# Patient Record
Sex: MALE | Race: WHITE | NOT HISPANIC OR LATINO | Employment: FULL TIME | URBAN - NONMETROPOLITAN AREA
[De-identification: names, ages, dates, MRNs, and addresses within clinical notes are randomized per-mention and may not be internally consistent; named-entity substitution may affect disease eponyms.]

---

## 2017-08-01 ENCOUNTER — HISTORY (OUTPATIENT)
Dept: FAMILY MEDICINE | Facility: OTHER | Age: 37
End: 2017-08-01

## 2017-08-01 ENCOUNTER — OFFICE VISIT - GICH (OUTPATIENT)
Dept: FAMILY MEDICINE | Facility: OTHER | Age: 37
End: 2017-08-01

## 2017-08-01 DIAGNOSIS — Z00.00 ENCOUNTER FOR GENERAL ADULT MEDICAL EXAMINATION WITHOUT ABNORMAL FINDINGS: ICD-10-CM

## 2017-08-01 DIAGNOSIS — Z15.02 GENETIC SUSCEPTIBILITY TO MALIGNANT NEOPLASM OF OVARY: ICD-10-CM

## 2017-08-01 DIAGNOSIS — F41.9 ANXIETY DISORDER: ICD-10-CM

## 2017-08-01 DIAGNOSIS — Z15.01 GENETIC SUSCEPTIBILITY TO MALIGNANT NEOPLASM OF BREAST: ICD-10-CM

## 2017-08-01 LAB
CHOL/HDL RATIO - HISTORICAL: 5.3
CHOLESTEROL TOTAL: 228 MG/DL
HDLC SERPL-MCNC: 43 MG/DL (ref 23–92)
LDLC SERPL CALC-MCNC: 126 MG/DL
NON-HDL CHOLESTEROL - HISTORICAL: 185 MG/DL
PATIENT STATUS - HISTORICAL: ABNORMAL
TRIGL SERPL-MCNC: 293 MG/DL

## 2017-08-02 ENCOUNTER — COMMUNICATION - GICH (OUTPATIENT)
Dept: SURGERY | Facility: OTHER | Age: 37
End: 2017-08-02

## 2017-08-02 ENCOUNTER — COMMUNICATION - GICH (OUTPATIENT)
Dept: FAMILY MEDICINE | Facility: OTHER | Age: 37
End: 2017-08-02

## 2017-08-02 DIAGNOSIS — Z15.02 GENETIC SUSCEPTIBILITY TO MALIGNANT NEOPLASM OF OVARY: ICD-10-CM

## 2017-08-02 DIAGNOSIS — Z15.01 GENETIC SUSCEPTIBILITY TO MALIGNANT NEOPLASM OF BREAST: ICD-10-CM

## 2017-08-02 DIAGNOSIS — Z12.11 ENCOUNTER FOR SCREENING FOR MALIGNANT NEOPLASM OF COLON: ICD-10-CM

## 2017-08-04 ENCOUNTER — AMBULATORY - GICH (OUTPATIENT)
Dept: SCHEDULING | Facility: OTHER | Age: 37
End: 2017-08-04

## 2017-10-05 ENCOUNTER — HISTORY (OUTPATIENT)
Dept: SURGERY | Facility: OTHER | Age: 37
End: 2017-10-05

## 2017-10-05 ENCOUNTER — SURGERY (OUTPATIENT)
Dept: SURGERY | Facility: OTHER | Age: 37
End: 2017-10-05

## 2017-10-05 ENCOUNTER — COMMUNICATION - GICH (OUTPATIENT)
Dept: SURGERY | Facility: OTHER | Age: 37
End: 2017-10-05

## 2017-10-05 ENCOUNTER — HOSPITAL ENCOUNTER (OUTPATIENT)
Dept: SURGERY | Facility: OTHER | Age: 37
Discharge: HOME | End: 2017-10-05
Attending: SURGERY | Admitting: SURGERY

## 2017-10-23 ENCOUNTER — AMBULATORY - GICH (OUTPATIENT)
Dept: FAMILY MEDICINE | Facility: OTHER | Age: 37
End: 2017-10-23

## 2017-10-23 DIAGNOSIS — Z23 ENCOUNTER FOR IMMUNIZATION: ICD-10-CM

## 2017-10-27 ENCOUNTER — HISTORY (OUTPATIENT)
Dept: UROLOGY | Facility: OTHER | Age: 37
End: 2017-10-27

## 2017-10-27 ENCOUNTER — OFFICE VISIT - GICH (OUTPATIENT)
Dept: UROLOGY | Facility: OTHER | Age: 37
End: 2017-10-27

## 2017-10-27 ENCOUNTER — AMBULATORY - GICH (OUTPATIENT)
Dept: UROLOGY | Facility: OTHER | Age: 37
End: 2017-10-27

## 2017-10-27 DIAGNOSIS — Z30.2 ENCOUNTER FOR STERILIZATION: ICD-10-CM

## 2017-10-27 DIAGNOSIS — Z30.09 ENCOUNTER FOR OTHER GENERAL COUNSELING AND ADVICE ON CONTRACEPTION: ICD-10-CM

## 2017-11-13 ENCOUNTER — COMMUNICATION - GICH (OUTPATIENT)
Dept: UROLOGY | Facility: OTHER | Age: 37
End: 2017-11-13

## 2017-11-13 ENCOUNTER — OFFICE VISIT - GICH (OUTPATIENT)
Dept: UROLOGY | Facility: OTHER | Age: 37
End: 2017-11-13

## 2017-11-13 ENCOUNTER — HISTORY (OUTPATIENT)
Dept: UROLOGY | Facility: OTHER | Age: 37
End: 2017-11-13

## 2017-11-13 DIAGNOSIS — T81.89XA OTHER COMPLICATIONS OF PROCEDURES, NOT ELSEWHERE CLASSIFIED, INITIAL ENCOUNTER: ICD-10-CM

## 2017-12-28 NOTE — PROGRESS NOTES
Patient Information     Patient Name MRN Sex Denilson Hopkins [id number] Male 1980      Progress Notes by Moisés Mendez MD at 2017 12:05 PM     Author:  Moisés Mendez MD Service:  (none) Author Type:  Physician     Filed:  2017  3:28 PM Encounter Date:  2017 Status:  Signed     :  Moisés Mendez MD (Physician)              SUBJECTIVE:    Julius Trammell is a 37 y.o. male who presents for px and establish care    HPI: Takes ativan about once a month or so.    He at bedtime BRCA positive.  Mom had ovarian cancer and through this he was tested.  He has had genetic counseling, advised to have a colonoscopy by age 39.    PROBLEM LIST:  Patient Active Problem List     Diagnosis  Code     Anxiety F41.9     BRCA gene positive Z15.01, Z15.02     PAST MEDICAL HISTORY:  Past Medical History:     Diagnosis  Date     BRCA positive      SURGICAL HISTORY:  Past Surgical History:      Procedure  Laterality Date     KNEE ARTHROSCOPY       TONSILLECTOMY         SOCIAL HISTORY:  Social History     Social History        Marital status:       Spouse name: N/A     Number of children:  N/A     Years of education:  N/A     Occupational History      Not on file.     Social History Main Topics         Smoking status:   Never Smoker     Smokeless tobacco:   Never Used     Alcohol use   0.6 oz/week      1 Cans of beer per week         Comment: WEEKLY      Drug use:   Not on file     Sexual activity:   Not on file     Other Topics  Concern     Not on file      Social History Narrative     , 2 kids.  Wife is the infectious disease nurse at the hospital     FAMILY HISTORY:No family history on file.   CURRENT MEDICATIONS:   No current outpatient prescriptions on file.     No current facility-administered medications for this visit.      Medications have been reviewed by me and are current to the best of my knowledge and ability.    ALLERGIES:  Demerol [meperidine]    REVIEW OF  "SYSTEMS:  General: denies any general problems.  Eyes: denies problems  Ears/Nose/Throat: denies problems  Cardiovascular: denies problems  Respiratory: denies problems  Gastrointestinal: denies problems  Genitourinary: denies problems  Musculoskeletal: denies problems  Skin: denies problems  Neurologic: denies problems  Psychiatric: see HPI  Endocrine: denies problems  Heme/Lymphatic: denies problems  Allergic/Immunologic: denies problems  PHQ Depression Screening 8/1/2017   Date of PHQ exam (doc flow) 8/1/2017   1. Lack of interest/pleasure 0 - Not at all   2. Feeling down/depressed 0 - Not at all   PHQ-2 TOTAL SCORE 0   Some recent data might be hidden       OBJECTIVE:  /68  Pulse 62  Ht 1.759 m (5' 9.25\")  Wt 97.6 kg (215 lb 4 oz)  BMI 31.56 kg/m2  EXAM:   General Appearance: Pleasant, alert, appropriate appearance for age. No acute distress  Head Exam: Normal. Normocephalic, atraumatic.  Eye Exam:  Normal external eye, conjunctiva, lids, cornea. ANTONIA.  Ear Exam: Normal TM's bilaterally. Normal auditory canals and external ears. Non-tender.  Nose Exam: Normal external nose, mucus membranes, and septum.  OroPharynx Exam:  Dental hygiene adequate. Normal buccal mucosa. Normal pharynx.  Neck Exam:  Supple, no masses or nodes.  Thyroid Exam: No nodules or enlargement.  Chest/Respiratory Exam: Normal chest wall and respirations. Clear to auscultation.  Cardiovascular Exam: Regular rate and rhythm. S1, S2, no murmur, click, gallop, or rubs.  Gastrointestinal Exam: Soft, non-tender, no masses or organomegaly.  Lymphatic Exam: Non-palpable nodes in neck, clavicular, axillary, or inguinal regions.  Musculoskeletal Exam: Back is straight and non-tender, full ROM of upper and lower extremities.  Foot Exam: Left and right foot: good pedal pulses, no lesions, nail hygiene good.  Skin: no rash or abnormalities  Neurologic Exam: Nonfocal, symmetric DTRs, normal gross motor, tone coordination and no " tremor.  Psychiatric Exam: Alert and oriented - appropriate affect.    ASSESSMENT/PLAN:    ICD-10-CM    1. Routine medical exam Z00.00 LIPID PANEL   2. Anxiety F41.9 LORazepam (ATIVAN) 1 mg tablet   3. BRCA gene positive Z15.01 COLONOSCOPY SCREENING-GI     Z15.02      Mr. Christine Body mass index is 31.56 kg/(m^2). This is out of the normal range for a 37 y.o. Normal range for ages 18+ is between 18.5 and 24.9. To lose weight we reviewed risks and benefits of appropriate options such as diet, exercise, and medications. Patient's strategy will be  self-directed nutrition plan and self-directed exercise program  BP Readings from Last 1 Encounters:08/01/17 : 132/68  Mr. Christine blood pressure is out of the normal range for adults. Per JNC-8 guidelines normal adult blood pressure is < 120/80, pre-hypertensive is between 120/80 and 139/89, and hypertension is 140/90 or greater. Risks of hypertension were discussed. Patient's strategy will be to recheck blood pressure in 12 months    Moisés Mendez MD ....................  8/1/2017   12:22 PM

## 2017-12-28 NOTE — TELEPHONE ENCOUNTER
Patient Information     Patient Name MRN Sex Denilson Hopkins [id number] Male 1980      Telephone Encounter by Janet Kaplan at 2017  8:11 AM     Author:  Janet Kaplan Service:  (none) Author Type:  (none)     Filed:  2017  8:13 AM Encounter Date:  2017 Status:  Signed     :  Janet Kaplan            Patient referred by Dr. Mendez for a colonoscopy ,  Diagnosis is BRCA positive.  Please advise.  Thank you.

## 2017-12-28 NOTE — OR POSTOP
Patient Information     Patient Name MRN Sex Denilson Hopkins [id number] Male 1980      OR PostOp by Phyllis Langley RN at 10/5/2017 10:47 AM     Author:  Phyllis Langley RN Service:  (none) Author Type:  NURS- Registered Nurse     Filed:  10/5/2017 10:47 AM Date of Service:  10/5/2017 10:47 AM Status:  Signed     :  Phyllis Langley RN (NURS- Registered Nurse)            Discharge Note    Data:  Julius Trammell has been discharged home at 1005 via ambulatory accompanied by Registered Nurse.      Action:  Written discharge/follow-up instructions were provided to patient. Prescriptions : None.  Belongings sent with patient. Medications from home sent with patient/family: Not Applicable  Equipment none .     Response:  Patient verbalized understanding of discharge instructions, reason for discharge, and necessary follow-up appointments. PHYLLIS LANGLEY RN ....................  10/5/2017   10:47 AM

## 2017-12-28 NOTE — OR ANESTHESIA
Patient Information     Patient Name MRN Sex Denilson Hopkins [id number] Male 1980      OR Anesthesia by Yolanda Worley CRNA at 10/5/2017  9:09 AM     Author:  Yolanda Worley CRNA Service:  (none) Author Type:  NURS- Nurse Anesthetist     Filed:  10/5/2017  9:10 AM Date of Service:  10/5/2017  9:09 AM Status:  Signed     :  Yolanda Worley CRNA (NURS- Nurse Anesthetist)            Anesthesia Post Operative Care Note    Name: Julius Trammell  MRN:   [id number]  :    1980       Procedure Done:  See Surgeon Note        Anesthesia Technique    Anesthetic Type:  MAC       MAC Type:  NC     Oral Trauma:  No    Intraoperative Course   Hemodynamics:  Stable    Ventilation Normal:  Yes Lung Sounds:  Normal      PACU Course        Nondepolarizer Used:       Reversed: N/A   Hemodynamics:  Stable      Hydration: Euvolemic   Temperature:  36.1 - 38.3      Mental Status:  Awake, alert, follows commands   Pain Management:  Adequate   Regional Block:  No   Anesthesia Complications:  None      Vital Signs:  Temp: 96.6  F (35.9  C)  Pulse: 69  BP: 123/74  Resp: 16  SpO2: 95 %                       Active Lines:  Patient Lines/Drains/Airways Status    Active Line     Name: Placement date: Placement time: Site: Days:    PERIPHERAL VAD Right Hand 20 10/05/17   0752   Hand   less than 1                Intake & Output:       Labs:  No results for input(s): PX2GTFGATKU, UMB1NXAZNISE, PHARTERIAL, HWT2ZXTKYKSG, P7HEFLJYNVZW in the last 24 hours.    No results for input(s): MAGNESIUM in the last 24 hours.    No results for input(s): GLUCOSEMETER in the last 720 hours.        Yolanda Worley CRNA ....................  10/5/2017   9:09 AM

## 2017-12-28 NOTE — TELEPHONE ENCOUNTER
Patient Information     Patient Name MRN Sex Denilson Hopkins [id number] Male 1980      Telephone Encounter by Janet Kaplan at 2017  3:12 PM     Author:  Janet Kaplan Service:  (none) Author Type:  (none)     Filed:  2017  3:18 PM Encounter Date:  2017 Status:  Signed     :  Janet Kaplan            Screening Questions for the Scheduling of Screening Colonoscopies   (If Colonoscopy is diagnostic, Provider should review the chart before scheduling.)  Are you younger than 50 or older than 80?  Yes   Do you take aspirin or fish oil?  NO (if yes, tell patient to stop 1 week prior to Colonoscopy)  Do you take warfarin (Coumadin), clopidogrel (Plavix), apixaban (Eliquis), dabigatram (Pradaxa), rivaroxaban (Xarelto) or any blood thinner? NO  Do you use oxygen at home?  NO  Do you have kidney disease? NO  Are you on dialysis? NO  Have you had a stroke or heart attack in the last year? NO  Have you had a stent in your heart or any blood vessel in the last year? NO  Have you had a transplant of any organ? NO  Have you had a colonoscopy or upper endoscopy (EGD) before? NO          When?  NO  Date of scheduled Colonoscopy. 2017  Provider RAYA SANTO

## 2017-12-28 NOTE — OR ANESTHESIA
Patient Information     Patient Name MRN Sex Denilson Garcia [id number] Male 1980      OR Anesthesia by Yolanda Worley CRNA at 10/5/2017  8:02 AM     Author:  Yolanda Worley CRNA Service:  (none) Author Type:  NURS- Nurse Anesthetist     Filed:  10/5/2017  8:02 AM Date of Service:  10/5/2017  8:02 AM Status:  Signed     :  Yolanda Worley CRNA (NURS- Nurse Anesthetist)                                                           ANESTHESIA ASSESSMENT    Date: 10/5/17 Time: 8:02 AM      Patient:  Julius Trammell    Procedure(s) (LRB):  COLONOSCOPY (N/A)    Past Medical History:     Diagnosis  Date     BRCA positive        Past Surgical History:      Procedure  Laterality Date     KNEE ARTHROSCOPY       TONSILLECTOMY         No family history on file.    Patient Active Problem List     Diagnosis  Code     Anxiety F41.9     BRCA gene positive Z15.01, Z15.02       Prescriptions Prior to Admission       Medication  Sig Dispense Refill     LORazepam (ATIVAN) 1 mg tablet Take 1 tablet by mouth every 6 hours if needed. 30 tablet 0       Allergies:  Allergies     Allergen  Reactions     Demerol [Meperidine] Anaphylaxis       Review of Systems:  GERD: Yes (once weekly, treated with zantac)  Chest pain: No  Shortness of breath: No  Recent fever: No  Poor exercise tolerance: No  Bleeding tendency: No  Pregnant: No  Anesthesia Complications: None      History    Smoking Status      Never Smoker   Smokeless Tobacco      Never Used     Social History     Social History        Marital status:       Spouse name: N/A     Number of children:  N/A     Years of education:  N/A     Social History Main Topics         Smoking status:   Never Smoker     Smokeless tobacco:   Never Used     Alcohol use   0.6 oz/week     1 Cans of beer per week        Comment: WEEKLY      Drug use:   No     Sexual activity:   Not on file     Other Topics  Concern     Not on file      Social History Narrative     , 2  kids.  Wife is the infectious disease nurse at the hospital       Physical Examination:  /74  Pulse 69  Temp 96.6  F (35.9  C)  Resp 16  SpO2 95% There is no height or weight on file to calculate BMI. There is no height or weight on file to calculate BSA.  Dental Condition: Good     Mallampati Score (Airway): II  Cardiovascular: Normal  Pulmonary: Normal  Other: (not recorded)    Recent Labs in [facility]:    No results for input(s): SODIUM, POTASSIUM, CHLORIDE, PU4DNOPK, ANIONGAP, BUN, CREATININE, BUNCREARATIO, CALCIUM, GLUCOSE, GLUCOSEMETER, KETONES, MAGNESIUM, WBC, HGB, HCT, PLT, ABORH, RHTYPE, PREGURINE, BHCGQL, HCGBETAQUANT, INR in the last 72 hours.          Assessment/Plan:  ASA Class: I  Risk of dental injury discussed: Yes  NPO status confirmed: Yes  Anesthetic Plan: MAC  Risk/Benefit/Alt discussed: Yes  Questions answered: Yes  Emergency Case?: No  Labs/ECG/Radiology Reviewed?: Yes      H&P Reviewed.  Patient Examined.      Provider Electronic Signature:  Yolanda Worley CRNA

## 2017-12-28 NOTE — TELEPHONE ENCOUNTER
Patient Information     Patient Name MRN Sex Denilson Hopkins [id number] Male 1980      Telephone Encounter by Shantel Ramsay MD at 2017 10:04 AM     Author:  Shantel Ramsay MD Service:  (none) Author Type:  Physician     Filed:  2017 10:04 AM Encounter Date:  2017 Status:  Signed     :  Shantel Ramsay MD (Physician)            Ok to schedule. Thanks! Shantel Ramsay MD ....................  2017   10:04 AM

## 2017-12-28 NOTE — PROGRESS NOTES
Patient Information     Patient Name MRN Sex Denilson Hopkins [id number] Male 1980      Progress Notes by Gurpreet Torrez MD at 10/27/2017 10:45 AM     Author:  Gurpreet Torrez MD Service:  (none) Author Type:  Physician     Filed:  10/27/2017 11:17 AM Encounter Date:  10/27/2017 Status:  Signed     :  Gurpreet Torrez MD (Physician)            Preoperative diagnosis  Elective sterilization    Postoperative diagnosis  Elective sterilization    Procedure performed  Bilateral vasectomy    Surgeon  Gurpreet Torrez MD    Anesthesia  8 mL lidocaine 2% local injection    Complications  None    EBL  <1 mL    Specimen  Left vas  Right vas    Indications  37 y.o. male agreed to undergo the above named procedure after discussion of the alternatives, risks and benefits.  Informed consent was obtained.      Procedure  The patient was brought to the procedure room and placed in supine position.  His scrotum was prepped with Hibiclens and he was draped in a sterile fashion.  A timeout was performed.    Lidocaine 2% was used to anesthetize the scrotal skin as well as perivasal sheath initially on the patient's left.  A 1 cm skin incision was created with the sharp-tip mosquito and a vas clamp utilized through this incision to grasp the vas.  The left vas was elevated to the skin surface and dissected free of its perivasal sheath.  The vas was transected and the lumen was cauterized both proximally and distally.  A 4-0 chromic suture was utilized to place the proximal vasal portion under the perivasal sheath, such that the 2 ends were divided by the perivasal sheath (fascial interposition).  This portion of the vas was then allowed to drop back into the left hemiscrotum.  The right side was treated next in the same manner as described above.  The skin incision was closed with the 4-0 chromic suture.  The patient tolerated the procedure well.    It was again reiterated to the patient that he would remain fertile for  sometime and should present to the office for a post-vacectomy semen analysis to confirm sterility in 3 months.  The patient again expressed understanding.

## 2017-12-28 NOTE — PROGRESS NOTES
Patient Information     Patient Name MRN Sex Denilson Hopkins [id number] Male 1980      Progress Notes by Gurpreet Torrez MD at 2017  2:30 PM     Author:  Gurpreet Torrez MD Service:  (none) Author Type:  Physician     Filed:  2017  3:18 PM Encounter Date:  2017 Status:  Signed     :  Gurpreet Torrez MD (Physician)            S/O  Patient presented today due to concerns about wound healing following vasectomy.  Patient has a localized less than 1 cm area of induration at the entry site of the procedure near the stitch.    A/P  exam revealed stitch granuloma- no signs of infection  Follow-up as needed

## 2017-12-28 NOTE — TELEPHONE ENCOUNTER
Patient Information     Patient Name MRN Sex Denilson Hopkins [id number] Male 1980      Telephone Encounter by Yanci Mello RN at 2017 12:01 PM     Author:  Yanci Mello RN Service:  (none) Author Type:  NURS- Registered Nurse     Filed:  2017 12:02 PM Encounter Date:  2017 Status:  Signed     :  Yanci Mello RN (NURS- Registered Nurse)            Patient states that the incision site feels really tight and was wondering if this was normal.  Appointment scheduled for patient to see Gurpreet Torrez MD today at 2:30.  Yanci Mello RN.........2017...12:02 PM

## 2017-12-28 NOTE — PROCEDURES
Patient Information     Patient Name MRN Sex Denilson Hopkins [id number] Male 1980      Procedures by Shantel Ramsay MD at 10/5/2017  9:09 AM     Author:  Shantel Ramsay MD Service:  (none) Author Type:  Physician     Filed:  10/5/2017  9:10 AM Date of Service:  10/5/2017  9:09 AM Status:  Signed     :  Shantel Ramsay MD (Physician)        Pre-procedure Diagnoses:    1. BRCA positive [Z15.01, Z15.02]           Post-procedure Diagnoses:    1. BRCA positive [Z15.01, Z15.02]           Procedures:    1. COLONOSCOPY SCREENING [243899 (Custom)]               PROCEDURE NOTE    SURGEON: Shantel Ramsay MD.    PRE-OP DIAGNOSIS:  Screening Colonoscopy      POST-OP DIAGNOSIS: normal colon    PROCEDURE:  Screening colonoscopy    ESTIMATED BLOOD LOSS: none    COMPLICATIONS:  None    SPECIMEN:  none    ANESTHESIA:  See anesthesia note, anesthesia requested due to: chronic benzodiazepine use    INDICATION FOR THE PROCEDURE: The patient is a 37 y.o. male. The patient has no complaints. I explained to the patient the risks, benefits and alternatives to screening colonoscopy for evaluating the colon for colon polyps and colon cancer. We specifically discussed the risks of bleeding, infection, perforation, potential inability to reach the cecum and the risks of sedation. The patient's questions were answered and the patient wished to proceed. Informed consent paperwork was completed.    PROCEDURE: The patient was taken to the endoscopy suite. Appropriate monitors were attached. The patient was placed in the left lateral decubitus position.Timeout was performed confirming the patient's identity and procedure to be performed.  After appropriate sedation was confirmed, digital rectal exam was performed.  There was normal tone and no gross abnormality was noted. The lubricated colonoscope was introduced into the anus the colon was insufflated with air. The prep quality was adequate. Under direct visualization the scope  was advanced to the cecum. The ileocecal valve was intubated and the terminal ileum inspected. No gross abnormality was noted. The scope was withdrawn back into the cecum. The mucosa of colon was inspected while withdrawing the scope. No abnormalities were noted. The scope was retroflexed in the rectum and the anorectal junction was inspected. No abnormalities were noted. The scope was returned to a neutral position and the colon was decompressed. The scope was removed. The patient tolerated the procedure with no immediately apparent complication. The patient was taken to recovery in stable condition.      FOLLOW UP:  RECOMMEND high fiber diet, follow up: 5 year screening colonoscopy will call if that changes!     Shantel Ramsay MD

## 2017-12-28 NOTE — PROGRESS NOTES
Patient Information     Patient Name MRN Denilson Villareal [id number] Male 1980      Progress Notes by Gurpreet Torrez MD at 10/27/2017 10:00 AM     Author:  Gurpreet Torrez MD Service:  (none) Author Type:  Physician     Filed:  10/27/2017 10:29 AM Encounter Date:  10/27/2017 Status:  Signed     :  Gurpreet Torrez MD (Physician)            Type of Visit  NPV    Chief Complaint  Elective sterilization    HPI  Mr. Trammell is a 37 y.o. male who presents with desire for irreversible, elective sterilization.    He has 2 kids.    His wife is supportive of this decision.    He has been considering this decision for 1 years.  He denies erectile dysfunction.      Past Medical History  He  has a past medical history of BRCA 1 positive.  Patient Active Problem List     Diagnosis  Code     Anxiety F41.9     BRCA gene positive Z15.01, Z15.02       Past Surgical History  He  has a past surgical history that includes tonsillectomy; knee arthroscopy; and colonoscopy screening (10/05/2017).    Medications  He has a current medication list which includes the following prescription(s): hydrocodone-acetaminophen (5-325 mg) and lorazepam.    Allergies  Allergies     Allergen  Reactions     Demerol [Meperidine] Anaphylaxis       Social History  He  reports that he has never smoked. He has never used smokeless tobacco. He reports that he drinks about 0.6 oz of alcohol per week  He reports that he does not use illicit drugs.  No drug abuse.    Family History  Family History       Problem   Relation Age of Onset     Cancer  Mother      fallopian tube, breast-BRCA +       No Known Problems  Sister      BRCA Negative       No Known Problems  Brother      BRCA negative       No Known Problems  Brother      hasn't had genetic testing         Review of Systems  I personally reviewed the ROS with the patient.    Nursing Notes:   Preeti Nuno  10/27/2017 10:08 AM  Unsigned  Here for Vasectomy consult and Vasectomy.  Review  of Systems:    Weight loss:    No     Recent fever/chills:  No   Night sweats:   No  Current skin rash:  No   Recent hair loss:  No  Heat intolerance:  No   Cold intolerance:  No  Chest pain:   No   Palpitations:   No  Shortness of breath:  No   Wheezing:   No  Constipation:    No   Diarrhea:   No   Nausea:   No   Vomiting:   No   Kidney/side pain:  No   Back pain:   No  Frequent headaches:  No   Dizziness:     No  Leg swelling:   No   Calf pain:    No        Parents, brothers or sisters with history of kidney cancer?   No  Parents, brothers or sisters with history of bladder cancer: No    Preeti Nuno KENNEDY  10/27/2017  10:08 AM          Physical Exam  Vitals:     10/27/17 1006   BP: 120/80   Resp: 14   Weight: 97.5 kg (215 lb)     Constitutional: NAD, WDWN.   Head: NCAT  Eyes: Conjunctivae normal  Cardiovascular: Regular rate.  Pulmonary/Chest: Respirations are even and non-labored bilaterally.  Abdominal: Soft. No distension, tenderness, masses or guarding. No CVA tenderness.  Neurological: A + O x 3.  Cranial Nerves II-XII grossly intact.  Extremities: CONI x 4, Warm. No clubbing.  No cyanosis.    Skin: Pink, warm and dry.  No rashes noted.  Psychiatric:  Normal mood and affect  Genitourinary:  Phallus normal without lesions, testicles descended bilaterally, no masses.    Bilateral vasa palpable    Assessment  Mr. Trammell is a 37 y.o. male who presents today requesting permanent, irreversible surgical sterilization.  He was instructed to trim his pubic hair the night prior with a clippers.  The vasectomy was discussed in detail with the patient today including the risks which are failure of the procedure, infection, bleeding and/or development of chronic testicular pain.      Furthermore, the patient was told he would remain fertile following the procedure until he provided a semen analysis that met the definition of infertile (no sperm present or <100,000 nonmotile sperm/mL)  This is usually planned for 3  months after the procedure.  The patient expressed understanding and desire to proceed.    Plan  Rx for Norco prior to and after the procedure.     He understands he needs a  the day of the procedure if he chooses to take the narcotic.  Provided vasectomy hand out again outlining the above risks and benefits as well as need for follow up semen analysis

## 2017-12-29 NOTE — H&P
Patient Information     Patient Name MRN Sex Denilson Hopkins [id number] Male 1980      H&P by Shantel Ramsay MD at 10/5/2017  8:33 AM     Author:  Shantel Ramsay MD Service:  (none) Author Type:  Physician     Filed:  10/5/2017  8:37 AM Date of Service:  10/5/2017  8:33 AM Status:  Signed     :  Shantel Ramsay MD (Physician)            PRE-PROCEDURE NOTE    CHIEF COMPLAINT / REASON FOR PROCEDURE:  Need for screening colonoscopy.    PERTINENT HISTORY   Patient with no complaints. He has a history of BRCA +. Previous colonoscopy none. No diarrhea, constipation, abdominal pain or rectal bleeding. No family history of colon polyps or colon cancer.    Past Medical History:     Diagnosis  Date     BRCA positive      Past Surgical History:      Procedure  Laterality Date     KNEE ARTHROSCOPY       TONSILLECTOMY       Other:  None  Bleeding tendencies:  No    Relevant Family History:  mother BRCA +    Relevant Social History:  None    A relevant review of systems was performed and was negative.    ALLERGIES/SENSITIVITIES:   Allergies     Allergen  Reactions     Demerol [Meperidine] Anaphylaxis        CURRENT MEDICATIONS:    Current Facility-Administered Medications        Medication  Dose Route Frequency Last Rate     lactated Ringers infusion  25 mL/hr Intravenous continuous 25 mL/hr (10/05/17 0752)     lidocaine (1%) injection 0.1-1 mL  0.1-1 mL Intra-Dermal one time prn       sodium chloride 0.9% 5 mL syringe (NORMAL SALINE)  5 mL Intravenous Each Time PRN        Prior to Admission medications          Medication Sig Start Date End Date Taking? Last Dose Authorizing Provider   LORazepam (ATIVAN) 1 mg tablet Take 1 tablet by mouth every 6 hours if needed. 17   [facility]       PRE-SEDATION ASSESSMENT:    Lung Exam:  Normal  Heart Exam:  Normal    Comment(s):      IMPRESSION:  Need for screening colonoscopy.    PLAN:  I discussed screening colonoscopy with the patient.  Anesthesia coverage requested due to chronic benzodiazepine use.    Shantel Ramsay MD

## 2017-12-29 NOTE — PATIENT INSTRUCTIONS
Patient Information     Patient Name MRDenilson Paz [id number] Male 1980      Patient Instructions by Bertha Zee RN at 10/27/2017 10:45 AM     Author:  Bertha Zee RN Service:  (none) Author Type:  NURS- Registered Nurse     Filed:  10/27/2017 10:26 AM Encounter Date:  10/27/2017 Status:  Signed     :  Bertha Zee RN (NURS- Registered Nurse)            Home Care after Vasectomy   Follow these guidelines for your care after your surgery to help your recovery.    Activity  Limit your activity for the first 5 days after the procedure to light activity.  You may return to work typically in 2 days.  Limit lifting, pushing or pulling to less than 20 pounds until you are no longer sore.   Limit running and long walks until you are no longer sore.   Limit sexual activity for 5 days.    Swelling  Scrotal swelling from the surgery may take weeks to get better. You should call your doctor if the swelling is severe and the scrotum is larger than an orange.  Apply a bag of frozen peas to the scrotum for 15 minutes every 1-2 hours for the first 48 hours when you are awake to limit swelling. It works best to not apply the bag of frozen peas directly to the skin.  Wear a jock strap to support your scrotum and reduce swelling.  Continue wearing the jock strap until you are no longer sore, 1-2 weeks.    Incision care  The single stitch placed in the scrotum will dissolve and does not need to be removed.  A small amount of blood may stain the dressings for up to 2-3 days after the procedure.  For the first few days, apply two or three gauze pads to the site each day and as needed to keep the dressing dry. This will protect the incision and help keep your clothes clean.  When you are no longer having any drainage, stop using the gauze pads over the site.    Bathing or showering  You may shower after the procedure but do not scrub the stitch area.  Allow the water to wash over the stitch and do not  scrub the area. Dry the site gently by patting it with a clean towel.  Tub baths should be avoided for 7 days after surgery.  Swimming should be avoided for 14 days after surgery.      Pain control  You were provided with a pain medication prescription during the clinic consultation, please use this as needed.  Also, please take ibuprofen as we discussed in clinic.  Pain most often is eased after 5 to 7 days.    Sexual activity  Please avoid ejaculating for 1 week after procedure.    Follow up  Following this procedure you are still considered fertile and would be able to impregnate your partner.  You should have a semen analysis performed 3 months or greater after your procedure to confirm sterility.  Ideally you would ejaculate about 2-3 dozen times following the vasectomy and prior to semen collection.  Use birth control until the semen analysis is confirmed sterile.    Use contraceptives until this is confirmed to prevent pregnancy.    Contacts  General Questions: [phone]  Appointments:  [phone]  Emergencies:  [phone]    When to call your doctor  Call the urology office if you have any of the following:   Severe swelling, larger than the size of an orange    A large amount of fluid drainage that soaks several pads per day   Pain that is not controlled with pain medicine, jock strap and use of frozen peas   Any signs of infection such as the following:    -Redness or tenderness around the incision site worsening after 2-3 days or   -Pus type drainage from the incision or   -Fever of greater than 101 degrees F    Also call the office if you have any questions or concerns about your care.

## 2017-12-30 NOTE — NURSING NOTE
Patient Information     Patient Name MRN Sex Denilson Hopkins [id number] Male 1980      Nursing Note by Bertha Zee RN at 10/27/2017 10:45 AM     Author:  Bertha Zee RN Service:  (none) Author Type:  NURS- Registered Nurse     Filed:  10/27/2017 11:17 AM Encounter Date:  10/27/2017 Status:  Signed     :  Bertha Zee RN (NURS- Registered Nurse)            Vasectomy  Per verbal order read back by Gurpreet Torrez MD to prep patient for vasectomy.  Patient positioned in supine position, perineum area prepped with chlorhexidene Gluconate and patient draped per sterile technique.    Lidocaine 2%  Lot #: [id number]  Expiration date: 2019  : [facility]  NDC: 4225-1129-30    [place] Protocol    A. Pre-procedure verification complete yes  1-relevant information / documentation available, reviewed and properly matched to the patient; 2-consent accurate and complete, 3-equipment and supplies available    B. Site marking complete N/A  Site marked if not in continuous attendance with patient    C. TIME OUT completed yes  Time Out was conducted just prior to starting procedure to verify the eight required elements: 1-patient identity, 2-consent accurate and complete, 3-position, 4-correct side/site marked (if applicable), 5-procedure, 6-relevant images / results properly labeled and displayed (if applicable), 7-antibiotics / irrigation fluids (if applicable), 8-safety precautions.    After procedure perineum area rinsed. Semen analysis container given to patient. Patient reminded to have a semen analysis performed 3 months after the procedure to confirm sterility and to ejaculate about 1-2 dozen times following the vasectomy and prior to semen collection. Discharge instructions reviewed with patient. Patient verbalized understanding of discharge instructions and discharged ambulatory.

## 2017-12-30 NOTE — NURSING NOTE
Patient Information     Patient Name MRN Denilson Villareal [id number] Male 1980      Nursing Note by Preeti Nuno at 10/27/2017 10:45 AM     Author:  Preeti Nuno Service:  (none) Author Type:  (none)     Filed:  10/27/2017 10:24 AM Encounter Date:  10/27/2017 Status:  Signed     :  Preeti Nuno            Here for vasectomy.  Preeti Nuno LPN  10/27/2017  10:24 AM

## 2017-12-30 NOTE — NURSING NOTE
Patient Information     Patient Name MRN Denilson Villareal [id number] Male 1980      Nursing Note by Preeti Nuno at 2017  2:30 PM     Author:  Preeti Nuno Service:  (none) Author Type:  (none)     Filed:  2017  2:39 PM Encounter Date:  2017 Status:  Signed     :  Preeti Nuno            Here for Vasectomy incision check.  Review of Systems:    Weight loss:    No     Recent fever/chills:  No   Night sweats:   No  Current skin rash:  No   Recent hair loss:  No  Heat intolerance:  No   Cold intolerance:  No  Chest pain:   No   Palpitations:   No  Shortness of breath:  No   Wheezing:   No  Constipation:    No   Diarrhea:   No   Nausea:   No   Vomiting:   No   Kidney/side pain:  No   Back pain:   No  Frequent headaches:  No   Dizziness:     No  Leg swelling:   No   Calf pain:    No    Preeti Nuno LPN  2017  2:34 PM

## 2017-12-30 NOTE — NURSING NOTE
Patient Information     Patient Name MRN Denilson Villareal [id number] Male 1980      Nursing Note by Sidra Canales at 2017 11:30 AM     Author:  Sidra Canales Service:  (none) Author Type:  (none)     Filed:  2017 12:01 PM Encounter Date:  2017 Status:  Signed     :  Sidra Canales            Patient  presents today to establish care.  Sidra Canales LPN...............2017   11:42 AM

## 2017-12-30 NOTE — NURSING NOTE
Patient Information     Patient Name MRN Denilson Villareal [id number] Male 1980      Nursing Note by Preeti Nuno at 10/27/2017 10:00 AM     Author:  Preeti Nuno Service:  (none) Author Type:  (none)     Filed:  10/27/2017 10:29 AM Encounter Date:  10/27/2017 Status:  Signed     :  Preeti Nuno            Here for Vasectomy consult and Vasectomy.  Review of Systems:    Weight loss:    No     Recent fever/chills:  No   Night sweats:   No  Current skin rash:  No   Recent hair loss:  No  Heat intolerance:  No   Cold intolerance:  No  Chest pain:   No   Palpitations:   No  Shortness of breath:  No   Wheezing:   No  Constipation:    No   Diarrhea:   No   Nausea:   No   Vomiting:   No   Kidney/side pain:  No   Back pain:   No  Frequent headaches:  No   Dizziness:     No  Leg swelling:   No   Calf pain:    No        Parents, brothers or sisters with history of kidney cancer?   No  Parents, brothers or sisters with history of bladder cancer: No    Preeti Nuno LPN  10/27/2017  10:08 AM

## 2018-01-25 VITALS
WEIGHT: 215 LBS | RESPIRATION RATE: 14 BRPM | RESPIRATION RATE: 16 BRPM | BODY MASS INDEX: 31.88 KG/M2 | HEIGHT: 69 IN | HEART RATE: 62 BPM | SYSTOLIC BLOOD PRESSURE: 120 MMHG | SYSTOLIC BLOOD PRESSURE: 132 MMHG | DIASTOLIC BLOOD PRESSURE: 80 MMHG | WEIGHT: 215 LBS | DIASTOLIC BLOOD PRESSURE: 70 MMHG | SYSTOLIC BLOOD PRESSURE: 110 MMHG | DIASTOLIC BLOOD PRESSURE: 68 MMHG | WEIGHT: 215.25 LBS

## 2018-01-25 VITALS — WEIGHT: 215 LBS | DIASTOLIC BLOOD PRESSURE: 80 MMHG | RESPIRATION RATE: 14 BRPM | SYSTOLIC BLOOD PRESSURE: 120 MMHG

## 2018-02-15 ENCOUNTER — DOCUMENTATION ONLY (OUTPATIENT)
Dept: FAMILY MEDICINE | Facility: OTHER | Age: 38
End: 2018-02-15

## 2018-02-15 PROBLEM — Z15.01 BRCA GENE POSITIVE: Status: ACTIVE | Noted: 2017-08-01

## 2018-02-15 PROBLEM — Z15.09 BRCA GENE POSITIVE: Status: ACTIVE | Noted: 2017-08-01

## 2018-02-15 PROBLEM — F41.9 ANXIETY: Status: ACTIVE | Noted: 2017-08-01

## 2018-02-28 DIAGNOSIS — Z98.52 STATUS POST VASECTOMY: Primary | ICD-10-CM

## 2018-07-23 NOTE — PROGRESS NOTES
Patient Information     Patient Name  Denilson Trammell MRN  [id number] Sex  Male   1980      Letter by Moisés Mendez MD at      Author:  Moisés Mendez MD Service:  (none) Author Type:  (none)    Filed:   Encounter Date:  2017 Status:  (Other)         [employer]  [address] [address] [address] [place] [address]    2017    Julius Trammell  [address] [facility] [address]    Dear Mr. Trammell    We have received your [employer] application. However, additional information is required before we can complete the process. The following item(s) are missing or incomplete:    Email does not match email listed in your medical record.    Demographic information in your health record, must be updated by your primary care clinic or by calling [employer] at [phone].    You will need to complete and submit a new application with all required information. Please go to [email or web address] to:    print new application form(s) by clicking on Sign up now    sign up online for an account for yourself.    For questions or technical assistance, call [phone].    Thank you for choosing [employer]!

## 2018-07-24 NOTE — PROGRESS NOTES
Patient Information     Patient Name  Denilson Trammell MRN  [id number] Sex  Male   1980      Letter by Moisés Mendez MD at      Author:  Moisés Mendez MD Service:  (none) Author Type:  (none)    Filed:   Encounter Date:  2017 Status:  (Other)           Julius Trammell  [address]   [place] [address]          2017    Dear Mr. Trammell:    Following are the tests completed during your last clinic visit.  Repeat this in a year after you lose the weight.    Results for orders placed or performed in visit on 17      LIPID PANEL      Result  Value Ref Range    CHOLESTEROL,TOTAL 228 (H) <200 mg/dL    TRIGLYCERIDES 293 (H) <150 mg/dL    HDL CHOLESTEROL 43 23 - 92 mg/dL    NON-HDL CHOLESTEROL 185 (H) <145 mg/dl    CHOL/HDL RATIO            5.30 (H) <4.50                    LDL CHOLESTEROL 126 (H) <100 mg/dL    PATIENT STATUS            NON-FASTING                         If you have any further questions or problems contact my office at  [phone].    Thank you,    Moisés Mendez MD

## 2018-07-24 NOTE — PROGRESS NOTES
Patient Information     Patient Name  Denilson Trammell MRN  [id number] Sex  Male   1980      Letter by Shantel Ramsay MD at      Author:  Shantel Ramsay MD Service:  (none) Author Type:  (none)    Filed:   Encounter Date:  10/5/2017 Status:  (Other)           Julius Trammell  [address]  [place] [address]          2017    Dear Mr. Trammell:    This letter is in regards to your colonoscopy that was done by Shantel Ramsay MD on 10/5/17.  Your colonoscopy was normal.  There were no polyps or other abnormalities found.  A repeat colonoscopy is recommended in 5 years unless you have problems.     Dr. Shantel Ramsay MD also recommends a high fiber diet. A fiber supplement such as Metamucil, FiberCon, or Citrucel is recommended. Generic forms of these supplements are fine. These are available over the counter.     If you have any questions, please call the Surgery department at [phone].  A copy of your colonoscopy will be placed in your electronic chart for your primary care provider's review.    Sincerely,        Marianna STANFORD LPN  General Surgery    Reviewed and electronically signed by provider.

## 2018-07-24 NOTE — PROGRESS NOTES
Patient Information     Patient Name  Denilson Trammell MRN  [id number] Sex  Male   1980      Letter by Shantel Ramsay MD at      Author:  Shantel Ramsay MD Service:  (none) Author Type:  (none)    Filed:   Encounter Date:  10/5/2017 Status:  (Other)           Julius Trammell  [address]  [place] [address]          2017    Dear Mr. Trammell:    This letter is in regards to your colonoscopy that was done by Shantel Ramsay MD on 10/5/17.  Your colonoscopy was normal.  There were no polyps or other abnormalities found.  A repeat colonoscopy is recommended in 10 years unless you have problems.     Dr. Shantel Ramsay MD also recommends a high fiber diet. A fiber supplement such as Metamucil, FiberCon, or Citrucel is recommended. Generic forms of these supplements are fine. These are available over the counter.     If you have any questions, please call the Surgery department at [phone].  A copy of your colonoscopy will be placed in your electronic chart for your primary care provider's review.    Sincerely,        Marianna STANFORD LPN  General Surgery    Reviewed and electronically signed by provider.

## 2019-10-26 ENCOUNTER — ALLIED HEALTH/NURSE VISIT (OUTPATIENT)
Dept: FAMILY MEDICINE | Facility: OTHER | Age: 39
End: 2019-10-26
Attending: FAMILY MEDICINE
Payer: COMMERCIAL

## 2019-10-26 DIAGNOSIS — Z23 NEED FOR PROPHYLACTIC VACCINATION AND INOCULATION AGAINST INFLUENZA: Primary | ICD-10-CM

## 2019-10-26 PROCEDURE — 90686 IIV4 VACC NO PRSV 0.5 ML IM: CPT

## 2019-10-26 PROCEDURE — 90471 IMMUNIZATION ADMIN: CPT

## 2020-03-02 ENCOUNTER — HEALTH MAINTENANCE LETTER (OUTPATIENT)
Age: 40
End: 2020-03-02

## 2020-12-14 ENCOUNTER — HEALTH MAINTENANCE LETTER (OUTPATIENT)
Age: 40
End: 2020-12-14

## 2021-04-18 ENCOUNTER — HEALTH MAINTENANCE LETTER (OUTPATIENT)
Age: 41
End: 2021-04-18

## 2021-04-23 ENCOUNTER — OFFICE VISIT (OUTPATIENT)
Dept: FAMILY MEDICINE | Facility: OTHER | Age: 41
End: 2021-04-23
Attending: FAMILY MEDICINE
Payer: COMMERCIAL

## 2021-04-23 VITALS
TEMPERATURE: 96.8 F | RESPIRATION RATE: 16 BRPM | HEIGHT: 70 IN | SYSTOLIC BLOOD PRESSURE: 114 MMHG | WEIGHT: 226 LBS | OXYGEN SATURATION: 97 % | HEART RATE: 69 BPM | DIASTOLIC BLOOD PRESSURE: 76 MMHG | BODY MASS INDEX: 32.35 KG/M2

## 2021-04-23 DIAGNOSIS — Z13.1 SCREENING FOR DIABETES MELLITUS: ICD-10-CM

## 2021-04-23 DIAGNOSIS — Z00.00 ROUTINE GENERAL MEDICAL EXAMINATION AT A HEALTH CARE FACILITY: Primary | ICD-10-CM

## 2021-04-23 DIAGNOSIS — Z13.220 LIPID SCREENING: ICD-10-CM

## 2021-04-23 LAB
CHOLEST SERPL-MCNC: 238 MG/DL
GLUCOSE SERPL-MCNC: 96 MG/DL (ref 70–105)
HDLC SERPL-MCNC: 43 MG/DL (ref 23–92)
LDLC SERPL CALC-MCNC: 153 MG/DL
NONHDLC SERPL-MCNC: 195 MG/DL
TRIGL SERPL-MCNC: 209 MG/DL

## 2021-04-23 PROCEDURE — 90715 TDAP VACCINE 7 YRS/> IM: CPT | Performed by: FAMILY MEDICINE

## 2021-04-23 PROCEDURE — 99386 PREV VISIT NEW AGE 40-64: CPT | Mod: 25 | Performed by: FAMILY MEDICINE

## 2021-04-23 PROCEDURE — 82947 ASSAY GLUCOSE BLOOD QUANT: CPT | Mod: ZL | Performed by: FAMILY MEDICINE

## 2021-04-23 PROCEDURE — 36415 COLL VENOUS BLD VENIPUNCTURE: CPT | Mod: ZL | Performed by: FAMILY MEDICINE

## 2021-04-23 PROCEDURE — 90471 IMMUNIZATION ADMIN: CPT | Performed by: FAMILY MEDICINE

## 2021-04-23 PROCEDURE — 80061 LIPID PANEL: CPT | Mod: ZL | Performed by: FAMILY MEDICINE

## 2021-04-23 ASSESSMENT — MIFFLIN-ST. JEOR: SCORE: 1936.38

## 2021-04-23 ASSESSMENT — PAIN SCALES - GENERAL: PAINLEVEL: NO PAIN (0)

## 2021-04-23 NOTE — PROGRESS NOTES
SUBJECTIVE:   CC: Denilson Trammell is an 40 year old male who presents for preventative health visit.       Patient has been advised of split billing requirements and indicates understanding: Yes  HPI            Today's PHQ-2 Score:   PHQ-2 ( 1999 [employer]) 4/23/2021   Q1: Little interest or pleasure in doing things 0   Q2: Feeling down, depressed or hopeless 0   PHQ-2 Score 0       Abuse: Current or Past(Physical, Sexual or Emotional)- No  Do you feel safe in your environment? Yes    Have you ever done Advance Care Planning? (For example, a Health Directive, POLST, or a discussion with a medical provider or your loved ones about your wishes): Yes, patient states has an Advance Care Planning document and will bring a copy to the clinic.    Social History     Tobacco Use     Smoking status: Never Smoker     Smokeless tobacco: Never Used   Substance Use Topics     Alcohol use: Yes     Alcohol/week: 1.0 standard drinks     Comment: Alcoholic Drinks/day: WEEKLY     If you drink alcohol do you typically have >3 drinks per day or >7 drinks per week? Yes       No flowsheet data found.    Last PSA: No results found for: PSA    Reviewed orders with patient. Reviewed health maintenance and updated orders accordingly - Yes  Lab work is in process  Labs reviewed in [facility]  Current Outpatient Medications   Medication Sig Dispense Refill     LORazepam (ATIVAN) 1 mg/0.5 mL (HIGH CONC) solution Take 1 mg by mouth every 8 hours as needed for anxiety       Allergies   Allergen Reactions     Meperidine Anaphylaxis       Reviewed and updated as needed this visit by clinical staff  Tobacco  Allergies  Meds   Med Hx  Surg Hx  Fam Hx  Soc Hx        Reviewed and updated as needed this visit by Provider                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   CONSTITUTIONAL: NEGATIVE for fever, chills, change in weight  INTEGUMENTARY/SKIN: NEGATIVE for worrisome rashes, moles or lesions  EYES: NEGATIVE for vision changes or irritation  ENT: NEGATIVE for ear, mouth and throat problems  RESP: NEGATIVE for significant cough or SOB  CV: NEGATIVE for chest pain, palpitations or peripheral edema  GI: NEGATIVE for nausea, abdominal pain, heartburn, or change in bowel habits   male: negative for dysuria, hematuria, decreased urinary stream, erectile dysfunction, urethral discharge  MUSCULOSKELETAL: NEGATIVE for significant arthralgias or myalgia  NEURO: NEGATIVE for weakness, dizziness or paresthesias  PSYCHIATRIC: NEGATIVE for changes in mood or affect    OBJECTIVE:   /76 (BP Location: Right arm, Patient Position: Sitting, Cuff Size: Adult Regular)   Pulse 69   Temp 96.8  F (36  C) (Tympanic)   Resp 16   Ht 1.778 m (5' 10\")   Wt 102.5 kg (226 lb)   SpO2 97%   BMI 32.43 kg/m      Physical Exam  GENERAL: healthy, alert and no distress  EYES: Eyes grossly normal to inspection, PERRL and conjunctivae and sclerae normal  HENT: ear canals and TM's normal, nose and mouth without ulcers or lesions  NECK: no adenopathy, no asymmetry, masses, or scars and thyroid normal to palpation  RESP: lungs clear to auscultation - no rales, rhonchi or wheezes  CV: regular rate and rhythm, normal S1 S2, no S3 or S4, no murmur, click or rub, no peripheral edema and peripheral pulses strong  ABDOMEN: soft, nontender, no hepatosplenomegaly, no masses and bowel sounds normal  MS: no gross musculoskeletal defects noted, no edema  SKIN: no suspicious lesions or rashes  NEURO: Normal strength and tone, mentation intact and speech normal  PSYCH: mentation appears normal, affect normal/bright        ASSESSMENT/PLAN:       " "ICD-10-CM    1. Routine general medical examination at a health care facility  Z00.00    2. Screening for diabetes mellitus  Z13.1 Glucose   3. Lipid screening  Z13.220 Lipid Panel       Patient has been advised of split billing requirements and indicates understanding: Yes  COUNSELING:   Reviewed preventive health counseling, as reflected in patient instructions       Regular exercise       Healthy diet/nutrition       Colon cancer screening    Estimated body mass index is 32.43 kg/m  as calculated from the following:    Height as of this encounter: 1.778 m (5' 10\").    Weight as of this encounter: 102.5 kg (226 lb).     Weight management plan: Discussed healthy diet and exercise guidelines    He reports that he has never smoked. He has never used smokeless tobacco.      Counseling Resources:  ATP IV Guidelines  Pooled Cohorts Equation Calculator  FRAX Risk Assessment  ICSI Preventive Guidelines  Dietary Guidelines for Americans, 2010  USDA's MyPlate  ASA Prophylaxis  Lung CA Screening    Moisés Mendez MD  [facility]  "

## 2021-04-23 NOTE — NURSING NOTE
"Chief Complaint   Patient presents with     Physical     Annual well check       Initial /76 (BP Location: Right arm, Patient Position: Sitting, Cuff Size: Adult Regular)   Pulse 69   Temp 96.8  F (36  C) (Tympanic)   Resp 16   Ht 1.778 m (5' 10\")   Wt 102.5 kg (226 lb)   SpO2 97%   BMI 32.43 kg/m   Estimated body mass index is 32.43 kg/m  as calculated from the following:    Height as of this encounter: 1.778 m (5' 10\").    Weight as of this encounter: 102.5 kg (226 lb).  Medication Reconciliation: complete    Starr Gonzalez LPN      "

## 2021-10-02 ENCOUNTER — HEALTH MAINTENANCE LETTER (OUTPATIENT)
Age: 41
End: 2021-10-02

## 2022-07-09 ENCOUNTER — HEALTH MAINTENANCE LETTER (OUTPATIENT)
Age: 42
End: 2022-07-09

## 2022-09-03 ENCOUNTER — HEALTH MAINTENANCE LETTER (OUTPATIENT)
Age: 42
End: 2022-09-03

## 2022-09-14 ENCOUNTER — TELEPHONE (OUTPATIENT)
Dept: FAMILY MEDICINE | Facility: OTHER | Age: 42
End: 2022-09-14

## 2022-09-14 DIAGNOSIS — Z12.11 COLON CANCER SCREENING: Primary | ICD-10-CM

## 2022-09-14 NOTE — TELEPHONE ENCOUNTER
Patient called today to schedule a colonoscopy as he had a message on [employer] to schedule. His last one was 10/05/2017 with a recommendation for for 5 year follow up due to BRCA gene positive however his last visit was 04/23/201 so it has been over a year since he has been seen. Does he need a visit with you first or can an order be put in and we can just schedule. Please advise. Thank You,Marlyn Hobbs on 9/14/2022 at 10:25 AM

## 2022-11-25 DIAGNOSIS — Z12.11 SPECIAL SCREENING FOR MALIGNANT NEOPLASMS, COLON: Primary | ICD-10-CM

## 2022-11-25 NOTE — TELEPHONE ENCOUNTER
Screening Questions for the Scheduling of Screening Colonoscopies   (If Colonoscopy is diagnostic, Provider should review the chart before scheduling.)  Are you younger than 50 or older than 80?  YES  Do you take aspirin or fish oil?  NO (if yes, tell patient to stop 1 week prior to Colonoscopy)  Do you take warfarin (Coumadin), clopidogrel (Plavix), apixaban (Eliquis), dabigatram (Pradaxa), rivaroxaban (Xarelto) or any blood thinner? NO  Do you use oxygen at home?  NO  Do you have kidney disease? NO  Are you on dialysis? ON  Have you had a stroke or heart attack in the last year? ON  Have you had a stent in your heart or any blood vessel in the last year? ON  Have you had a transplant of any organ? NO  Have you had a colonoscopy or upper endoscopy (EGD) before? YES         When?  10/05/2017  Date of scheduled Colonoscopy. 01/25/2023  Provider RAYA Valle [facility] COVID TEST 01/23/2023

## 2022-11-29 RX ORDER — BISACODYL 5 MG/1
TABLET, DELAYED RELEASE ORAL
Qty: 2 TABLET | Refills: 0 | Status: ON HOLD | OUTPATIENT
Start: 2022-11-29 | End: 2023-01-25

## 2022-11-29 RX ORDER — POLYETHYLENE GLYCOL 3350, SODIUM CHLORIDE, SODIUM BICARBONATE, POTASSIUM CHLORIDE 420; 11.2; 5.72; 1.48 G/4L; G/4L; G/4L; G/4L
4000 POWDER, FOR SOLUTION ORAL ONCE
Qty: 4000 ML | Refills: 0 | Status: SHIPPED | OUTPATIENT
Start: 2022-11-29 | End: 2022-11-29

## 2023-01-25 ENCOUNTER — HOSPITAL ENCOUNTER (OUTPATIENT)
Facility: OTHER | Age: 43
Discharge: HOME | End: 2023-01-25
Attending: SURGERY | Admitting: SURGERY
Payer: COMMERCIAL

## 2023-01-25 ENCOUNTER — ANESTHESIA (OUTPATIENT)
Dept: SURGERY | Facility: OTHER | Age: 43
End: 2023-01-25
Payer: COMMERCIAL

## 2023-01-25 ENCOUNTER — ANESTHESIA EVENT (OUTPATIENT)
Dept: SURGERY | Facility: OTHER | Age: 43
End: 2023-01-25
Payer: COMMERCIAL

## 2023-01-25 VITALS
HEIGHT: 70 IN | WEIGHT: 215 LBS | DIASTOLIC BLOOD PRESSURE: 83 MMHG | HEART RATE: 62 BPM | SYSTOLIC BLOOD PRESSURE: 114 MMHG | BODY MASS INDEX: 30.78 KG/M2 | RESPIRATION RATE: 16 BRPM | OXYGEN SATURATION: 98 % | TEMPERATURE: 97.3 F

## 2023-01-25 DIAGNOSIS — Z80.0 FAMILY HISTORY OF COLON CANCER REQUIRING SCREENING COLONOSCOPY: ICD-10-CM

## 2023-01-25 DIAGNOSIS — Z15.09 BRCA GENE POSITIVE: Primary | ICD-10-CM

## 2023-01-25 DIAGNOSIS — Z15.01 BRCA GENE POSITIVE: Primary | ICD-10-CM

## 2023-01-25 PROCEDURE — G0121 COLON CA SCRN NOT HI RSK IND: HCPCS | Performed by: SURGERY

## 2023-01-25 PROCEDURE — 999N000010 HC STATISTIC ANES STAT CODE-CRNA PER MINUTE: Performed by: SURGERY

## 2023-01-25 PROCEDURE — 45378 DIAGNOSTIC COLONOSCOPY: CPT | Performed by: NURSE ANESTHETIST, CERTIFIED REGISTERED

## 2023-01-25 PROCEDURE — 250N000009 HC RX 250: Performed by: NURSE ANESTHETIST, CERTIFIED REGISTERED

## 2023-01-25 PROCEDURE — 250N000011 HC RX IP 250 OP 636: Performed by: NURSE ANESTHETIST, CERTIFIED REGISTERED

## 2023-01-25 PROCEDURE — 258N000003 HC RX IP 258 OP 636: Performed by: SURGERY

## 2023-01-25 PROCEDURE — 45378 DIAGNOSTIC COLONOSCOPY: CPT | Performed by: SURGERY

## 2023-01-25 RX ORDER — PROCHLORPERAZINE MALEATE 5 MG
10 TABLET ORAL EVERY 6 HOURS PRN
Status: DISCONTINUED | OUTPATIENT
Start: 2023-01-25 | End: 2023-01-25 | Stop reason: HOSPADM

## 2023-01-25 RX ORDER — SODIUM CHLORIDE, SODIUM LACTATE, POTASSIUM CHLORIDE, CALCIUM CHLORIDE 600; 310; 30; 20 MG/100ML; MG/100ML; MG/100ML; MG/100ML
INJECTION, SOLUTION INTRAVENOUS CONTINUOUS
Status: DISCONTINUED | OUTPATIENT
Start: 2023-01-25 | End: 2023-01-25 | Stop reason: HOSPADM

## 2023-01-25 RX ORDER — ONDANSETRON 2 MG/ML
4 INJECTION INTRAMUSCULAR; INTRAVENOUS EVERY 6 HOURS PRN
Status: DISCONTINUED | OUTPATIENT
Start: 2023-01-25 | End: 2023-01-25 | Stop reason: HOSPADM

## 2023-01-25 RX ORDER — NALOXONE HYDROCHLORIDE 0.4 MG/ML
0.2 INJECTION, SOLUTION INTRAMUSCULAR; INTRAVENOUS; SUBCUTANEOUS
Status: DISCONTINUED | OUTPATIENT
Start: 2023-01-25 | End: 2023-01-25 | Stop reason: HOSPADM

## 2023-01-25 RX ORDER — NALOXONE HYDROCHLORIDE 0.4 MG/ML
0.4 INJECTION, SOLUTION INTRAMUSCULAR; INTRAVENOUS; SUBCUTANEOUS
Status: DISCONTINUED | OUTPATIENT
Start: 2023-01-25 | End: 2023-01-25 | Stop reason: HOSPADM

## 2023-01-25 RX ORDER — FLUMAZENIL 0.1 MG/ML
0.2 INJECTION, SOLUTION INTRAVENOUS
Status: DISCONTINUED | OUTPATIENT
Start: 2023-01-25 | End: 2023-01-25 | Stop reason: HOSPADM

## 2023-01-25 RX ORDER — PROPOFOL 10 MG/ML
INJECTION, EMULSION INTRAVENOUS CONTINUOUS PRN
Status: DISCONTINUED | OUTPATIENT
Start: 2023-01-25 | End: 2023-01-25

## 2023-01-25 RX ORDER — ONDANSETRON 2 MG/ML
4 INJECTION INTRAMUSCULAR; INTRAVENOUS
Status: DISCONTINUED | OUTPATIENT
Start: 2023-01-25 | End: 2023-01-25 | Stop reason: HOSPADM

## 2023-01-25 RX ORDER — LIDOCAINE 40 MG/G
CREAM TOPICAL
Status: DISCONTINUED | OUTPATIENT
Start: 2023-01-25 | End: 2023-01-25 | Stop reason: HOSPADM

## 2023-01-25 RX ORDER — ONDANSETRON 4 MG/1
4 TABLET, ORALLY DISINTEGRATING ORAL EVERY 6 HOURS PRN
Status: DISCONTINUED | OUTPATIENT
Start: 2023-01-25 | End: 2023-01-25 | Stop reason: HOSPADM

## 2023-01-25 RX ORDER — PROPOFOL 10 MG/ML
INJECTION, EMULSION INTRAVENOUS PRN
Status: DISCONTINUED | OUTPATIENT
Start: 2023-01-25 | End: 2023-01-25

## 2023-01-25 RX ORDER — LIDOCAINE HYDROCHLORIDE 20 MG/ML
INJECTION, SOLUTION INFILTRATION; PERINEURAL PRN
Status: DISCONTINUED | OUTPATIENT
Start: 2023-01-25 | End: 2023-01-25

## 2023-01-25 RX ADMIN — PROPOFOL 175 MCG/KG/MIN: 10 INJECTION, EMULSION INTRAVENOUS at 08:40

## 2023-01-25 RX ADMIN — LIDOCAINE HYDROCHLORIDE 40 MG: 20 INJECTION, SOLUTION INFILTRATION; PERINEURAL at 08:41

## 2023-01-25 RX ADMIN — PROPOFOL 50 MG: 10 INJECTION, EMULSION INTRAVENOUS at 08:43

## 2023-01-25 RX ADMIN — SODIUM CHLORIDE, POTASSIUM CHLORIDE, SODIUM LACTATE AND CALCIUM CHLORIDE: 600; 310; 30; 20 INJECTION, SOLUTION INTRAVENOUS at 08:32

## 2023-01-25 RX ADMIN — PROPOFOL 150 MG: 10 INJECTION, EMULSION INTRAVENOUS at 08:41

## 2023-01-25 ASSESSMENT — ACTIVITIES OF DAILY LIVING (ADL)
ADLS_ACUITY_SCORE: 35
ADLS_ACUITY_SCORE: 35

## 2023-01-25 NOTE — ANESTHESIA CARE TRANSFER NOTE
Patient: Denilson Trammell    Procedure: Procedure(s):  COLONOSCOPY       Diagnosis: Screening for colon cancer [Z12.11]  Diagnosis Additional Information: No value filed.    Anesthesia Type:   MAC     Note:    Oropharynx: oropharynx clear of all foreign objects and spontaneously breathing  Level of Consciousness: drowsy  Oxygen Supplementation: room air    Independent Airway: airway patency satisfactory and stable  Dentition: dentition unchanged  Vital Signs Stable: post-procedure vital signs reviewed and stable  Report to RN Given: handoff report given  Patient transferred to: Phase II    Handoff Report: Identifed the Patient, Identified the Reponsible Provider, Reviewed the pertinent medical history, Discussed the surgical course, Reviewed Intra-OP anesthesia mangement and issues during anesthesia, Set expectations for post-procedure period and Allowed opportunity for questions and acknowledgement of understanding      Vitals:  Vitals Value Taken Time   BP     Temp     Pulse     Resp     SpO2         Electronically Signed By: PADMINI Obrien CRNA  January 25, 2023  9:14 AM

## 2023-01-25 NOTE — DISCHARGE INSTRUCTIONS
Procedure you had done: screening colonoscopy  Your health care provider is:  Moisés Mendez  Your surgeon is Dr. Shantel Ramsay.   Please call your health care provider or surgeon at [phone] if:    - you feel you are getting worse or having an increase in problems    - fever greater than 101 degrees  - increasing shortness of breath or chest pain  - any signs of infection (increasing redness, swelling, tenderness, warmth, change in appearance, or  increased drainage)  - blood in your urine or stool  - coughing or vomiting blood  - nausea (upset stomach) and vomiting and/or diarrhea that will not stop  - severe pain that is not relieved by medicine, rest or ice  You have had medications for sedation. Please be aware that this can cause drowsiness and impaired judgment for up to 24 hours after your procedure. Do not drive, operate power tools or drink alcohol for 24 hours.  If samples were taken-you will get a phone call and a letter with your results in the next 7-10 days. If you don't get results, please call and let us know!

## 2023-01-25 NOTE — OR NURSING
Santana has been discharged to home at 0954 via ambulatory accompanied by MATTHEW Barnett.    Written discharge instructions were provided to aSntana.  Prescriptions were none.  Patient states their pain is 0/10, and denies any nausea or dizziness upon discharge.    Patient and adult caring for them verbalize understanding of discharge instructions including no driving until tomorrow and no longer taking narcotic pain medications - no operating mechanical equipment and no making any important decisions.They understand reason for discharge, and necessary follow-up appointments.

## 2023-01-25 NOTE — ANESTHESIA POSTPROCEDURE EVALUATION
Patient: Denilson Trammell    Procedure: Procedure(s):  COLONOSCOPY       Anesthesia Type:  MAC    Note:  Disposition: Outpatient   Postop Pain Control: Uneventful            Sign Out: Well controlled pain   PONV: No   Neuro/Psych: Uneventful            Sign Out: Acceptable/Baseline neuro status   Airway/Respiratory: Uneventful            Sign Out: Acceptable/Baseline resp. status   CV/Hemodynamics: Uneventful            Sign Out: Acceptable CV status; No obvious hypovolemia; No obvious fluid overload   Other NRE: NONE   DID A NON-ROUTINE EVENT OCCUR? No           Last vitals:  Vitals Value Taken Time   /83 01/25/23 0930   Temp 97.3  F (36.3  C) 01/25/23 0939   Pulse 62 01/25/23 0930   Resp     SpO2 96 % 01/25/23 0936   Vitals shown include unvalidated device data.    Electronically Signed By: PADMINI Obrien CRNA  January 25, 2023  11:24 AM

## 2023-01-25 NOTE — OP NOTE
PROCEDURE NOTE    SURGEON:Shantel Ramsay MD.    PRE-OP DIAGNOSIS:  Screening Colonoscopy      POST-OP DIAGNOSIS: normal colon, BRCA +    PROCEDURE:  Screening colonoscopy    ESTIMATED BLOODLOSS: none    COMPLICATIONS:  None    SPECIMEN:  none    ANESTHESIA:  See anesthesia note    INDICATION FOR THE PROCEDURE: The patient is a 42 year old male. The patient has no complaints. I explained to the patient the risks, benefits and alternatives to screening colonoscopy for evaluating the colon for colon polyps and colon cancer. We specifically discussed the risks of bleeding, infection, perforation, potential inability to reach the cecum and the risks of sedation. The patient's questions were answered and the patient wished to proceed. Informed consent paperwork was completed.    PROCEDURE: The patient was taken to the endoscopy suite. Appropriate monitors were attached. The patient was placed in the left lateral decubitus position.Timeout was performed confirming the patient's identity and procedure to be performed. After appropriate sedation was confirmed, digital rectal exam was performed. There was normal tone and no gross abnormality was noted. The lubricated colonoscope was introduced into the anus the colon was insufflated with air. The prep quality was adequate. Under direct visualization the scope was advanced to the cecum. The ileocecal valve was intubated and the terminal ileum inspected. No gross abnormality was noted. The scope was withdrawn back into the cecum. The mucosa of colon was inspected while withdrawing the scope. No abnormalities were noted. The scope was retroflexed in the rectum and the anorectal junction was inspected. No abnormalities were noted. The scope was returned to a neutral position and the colon was decompressed. The scope was removed. The patient tolerated the procedure with no immediately apparent complication. The patient was taken to recovery in stable condition.    FOLLOW  UP:RECOMMEND high fiber diet, follow up: 5 year screening colonoscopy due to BRCA +

## 2023-01-25 NOTE — H&P
"PRE-PROCEDURE NOTE    CHIEF COMPLAINT / REASON FORPROCEDURE:  Need for screening colonoscopy.    PERTINENT HISTORY   Patient with no complaints. Previous colonoscopy 2017-no polyps. No diarrhea, constipation, abdominal pain or rectal bleeding. BRCA +.  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     No Comments Provided     Past Surgical History:   Procedure Laterality Date     ARTHROSCOPY KNEE      No Comments Provided     COLONOSCOPY  10/05/2017    10/05/2017,5 year follow up BRCA gene positive     HC KNEE SCOPE, DIAGNOSTIC      Description: Arthroscopy Knee Left;  Recorded: 10/23/2007;     HC REMOVE TONSILS/ADENOIDS,12+ Y/O      Description: Tonsillectomy With Adenoidectomy Over Age 12;  Recorded: 10/23/2007;     TONSILLECTOMY      No Comments Provided     Other:  None  Bleeding tendencies:  No    Relevant Family History:  yes, BRCA +    Relevant Social History:  none    A relevant review of systems was performed and was Negative.    ALLERGIES/SENSITIVITIES:   Allergies   Allergen Reactions     Meperidine Anaphylaxis        CURRENTMEDICATIONS:    No current facility-administered medications on file prior to encounter.  LORazepam (ATIVAN) 1 mg/0.5 mL (HIGH CONC) solution, Take 1 mg by mouth every 8 hours as needed for anxiety      No current facility-administered medications for this encounter.       PRE-SEDATION ASSESSMENT:    /81   Pulse 74   Temp 97  F (36.1  C) (Tympanic)   Resp 14   Ht 1.778 m (5' 10\")   Wt 97.5 kg (215 lb)   SpO2 95%   BMI 30.85 kg/m    Lung Exam:  Normal  Heart Exam:  Normal    Comment(s):      IMPRESSION:  Need for screening colonoscopy due to BRCA +.    PLAN:  I discussed screening colonoscopy with the patient.  "

## 2023-01-25 NOTE — ANESTHESIA PREPROCEDURE EVALUATION
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     No Comments Provided      Past Surgical History:   Procedure Laterality Date     ARTHROSCOPY KNEE      No Comments Provided     COLONOSCOPY  10/05/2017    10/05/2017,5 year follow up BRCA gene positive     HC KNEE SCOPE, DIAGNOSTIC      Description: Arthroscopy Knee Left;  Recorded: 10/23/2007;     HC REMOVE TONSILS/ADENOIDS,12+ Y/O      Description: Tonsillectomy With Adenoidectomy Over Age 12;  Recorded: 10/23/2007;     TONSILLECTOMY      No Comments Provided      Allergies   Allergen Reactions     Meperidine Anaphylaxis      Social History     Tobacco Use     Smoking status: Never     Smokeless tobacco: Never   Substance Use Topics     Alcohol use: Yes     Alcohol/week: 1.0 standard drink     Comment: Alcoholic Drinks/day: WEEKLY      Wt Readings from Last 1 Encounters:   23 97.5 kg (215 lb)        Anesthesia Evaluation            ROS/MED HX  ENT/Pulmonary:  - neg pulmonary ROS     Neurologic:  - neg neurologic ROS     Cardiovascular:  - neg cardiovascular ROS     METS/Exercise Tolerance: >4 METS    Hematologic:  - neg hematologic  ROS     Musculoskeletal:  - neg musculoskeletal ROS     GI/Hepatic:  - neg GI/hepatic ROS     Renal/Genitourinary:  - neg Renal ROS     Endo:  - neg endo ROS     Psychiatric/Substance Use:     (+) psychiatric history anxiety     Infectious Disease:  - neg infectious disease ROS     Malignancy:  - neg malignancy ROS     Other:  - neg other ROS          Physical Exam    Airway        Mallampati: II   TM distance: > 3 FB   Neck ROM: full   Mouth opening: > 3 cm    Respiratory Devices and Support         Dental       (+) Completely normal teeth      Cardiovascular   cardiovascular exam normal       Rhythm and rate: regular and normal      Pulmonary   pulmonary exam normal        breath sounds clear to auscultation           OUTSIDE LABS:  CBC: No results found for: WBC, HGB, HCT, PLT  BMP:   Lab Results   Component Value Date    GLC 96 04/23/2021     COAGS: No results found for: PTT, INR, FIBR  POC: No results found for: BGM, HCG, HCGS  HEPATIC: No results found for: ALBUMIN, PROTTOTAL, ALT, AST, GGT, ALKPHOS, BILITOTAL, BILIDIRECT, MARIE  OTHER: No results found for: PH, LACT, A1C, MINERVA, PHOS, MAG, LIPASE, AMYLASE, TSH, T4, T3, CRP, SED    Anesthesia Plan    ASA Status:  1   NPO Status:  NPO Appropriate    Anesthesia Type: MAC.              Consents    Anesthesia Plan(s) and associated risks, benefits, and realistic alternatives discussed. Questions answered and patient/representative(s) expressed understanding.     - Discussed: Risks, Benefits and Alternatives for the PROCEDURE were discussed     - Discussed with:  Patient         Postoperative Care            Comments:                David Kellerman, APRN CRNA

## 2023-07-22 ENCOUNTER — HEALTH MAINTENANCE LETTER (OUTPATIENT)
Age: 43
End: 2023-07-22

## 2024-09-14 ENCOUNTER — HEALTH MAINTENANCE LETTER (OUTPATIENT)
Age: 44
End: 2024-09-14

## 2025-02-24 ENCOUNTER — HOSPITAL ENCOUNTER (OUTPATIENT)
Dept: GENERAL RADIOLOGY | Facility: OTHER | Age: 45
Discharge: HOME | End: 2025-02-24
Attending: NURSE PRACTITIONER

## 2025-02-24 ENCOUNTER — OFFICE VISIT (OUTPATIENT)
Dept: FAMILY MEDICINE | Facility: OTHER | Age: 45
End: 2025-02-24

## 2025-02-24 VITALS
HEART RATE: 80 BPM | HEIGHT: 70 IN | BODY MASS INDEX: 32.93 KG/M2 | SYSTOLIC BLOOD PRESSURE: 128 MMHG | DIASTOLIC BLOOD PRESSURE: 72 MMHG | OXYGEN SATURATION: 97 % | WEIGHT: 230 LBS | RESPIRATION RATE: 18 BRPM | TEMPERATURE: 97.3 F

## 2025-02-24 DIAGNOSIS — K59.00 CONSTIPATION, UNSPECIFIED CONSTIPATION TYPE: ICD-10-CM

## 2025-02-24 DIAGNOSIS — R10.12 LEFT UPPER QUADRANT PAIN: Primary | ICD-10-CM

## 2025-02-24 LAB
ALBUMIN SERPL BCG-MCNC: 4.7 G/DL (ref 3.5–5.2)
ALBUMIN UR-MCNC: NEGATIVE MG/DL
ALP SERPL-CCNC: 67 U/L (ref 40–150)
ALT SERPL W P-5'-P-CCNC: 18 U/L (ref 0–70)
AMYLASE SERPL-CCNC: 39 U/L (ref 28–100)
ANION GAP SERPL CALCULATED.3IONS-SCNC: 11 MMOL/L (ref 7–15)
APPEARANCE UR: CLEAR
AST SERPL W P-5'-P-CCNC: 17 U/L (ref 0–45)
BASOPHILS # BLD AUTO: 0.1 10E3/UL (ref 0–0.2)
BASOPHILS NFR BLD AUTO: 1 %
BILIRUB SERPL-MCNC: 0.4 MG/DL
BILIRUB UR QL STRIP: NEGATIVE
BUN SERPL-MCNC: 14.1 MG/DL (ref 6–20)
CALCIUM SERPL-MCNC: 9.5 MG/DL (ref 8.8–10.4)
CHLORIDE SERPL-SCNC: 102 MMOL/L (ref 98–107)
COLOR UR AUTO: NORMAL
CREAT SERPL-MCNC: 0.99 MG/DL (ref 0.67–1.17)
CRP SERPL-MCNC: <3 MG/L
EGFRCR SERPLBLD CKD-EPI 2021: >90 ML/MIN/1.73M2
EOSINOPHIL # BLD AUTO: 0.1 10E3/UL (ref 0–0.7)
EOSINOPHIL NFR BLD AUTO: 1 %
ERYTHROCYTE [DISTWIDTH] IN BLOOD BY AUTOMATED COUNT: 12.5 % (ref 10–15)
GLUCOSE SERPL-MCNC: 93 MG/DL (ref 70–99)
GLUCOSE UR STRIP-MCNC: NEGATIVE MG/DL
HCO3 SERPL-SCNC: 27 MMOL/L (ref 22–29)
HCT VFR BLD AUTO: 40.4 % (ref 40–53)
HGB BLD-MCNC: 13.8 G/DL (ref 13.3–17.7)
HGB UR QL STRIP: NEGATIVE
IMM GRANULOCYTES # BLD: 0 10E3/UL
IMM GRANULOCYTES NFR BLD: 0 %
KETONES UR STRIP-MCNC: NEGATIVE MG/DL
LEUKOCYTE ESTERASE UR QL STRIP: NEGATIVE
LIPASE SERPL-CCNC: 32 U/L (ref 13–60)
LYMPHOCYTES # BLD AUTO: 1.9 10E3/UL (ref 0.8–5.3)
LYMPHOCYTES NFR BLD AUTO: 32 %
MCH RBC QN AUTO: 30.5 PG (ref 26.5–33)
MCHC RBC AUTO-ENTMCNC: 34.2 G/DL (ref 31.5–36.5)
MCV RBC AUTO: 89 FL (ref 78–100)
MONOCYTES # BLD AUTO: 0.4 10E3/UL (ref 0–1.3)
MONOCYTES NFR BLD AUTO: 6 %
NEUTROPHILS # BLD AUTO: 3.6 10E3/UL (ref 1.6–8.3)
NEUTROPHILS NFR BLD AUTO: 60 %
NITRATE UR QL: NEGATIVE
NRBC # BLD AUTO: 0 10E3/UL
NRBC BLD AUTO-RTO: 0 /100
PH UR STRIP: 7.5 [PH] (ref 5–9)
PLATELET # BLD AUTO: 253 10E3/UL (ref 150–450)
POTASSIUM SERPL-SCNC: 4.2 MMOL/L (ref 3.4–5.3)
PROT SERPL-MCNC: 7.4 G/DL (ref 6.4–8.3)
RBC # BLD AUTO: 4.52 10E6/UL (ref 4.4–5.9)
RBC URINE: 0 /HPF
SODIUM SERPL-SCNC: 140 MMOL/L (ref 135–145)
SP GR UR STRIP: 1.02 (ref 1–1.03)
UROBILINOGEN UR STRIP-MCNC: NORMAL MG/DL
WBC # BLD AUTO: 6 10E3/UL (ref 4–11)
WBC URINE: <1 /HPF

## 2025-02-24 PROCEDURE — 74019 RADEX ABDOMEN 2 VIEWS: CPT

## 2025-02-24 PROCEDURE — 81001 URINALYSIS AUTO W/SCOPE: CPT | Mod: ZL | Performed by: NURSE PRACTITIONER

## 2025-02-24 PROCEDURE — 82040 ASSAY OF SERUM ALBUMIN: CPT | Mod: ZL | Performed by: NURSE PRACTITIONER

## 2025-02-24 PROCEDURE — 36415 COLL VENOUS BLD VENIPUNCTURE: CPT | Mod: ZL | Performed by: NURSE PRACTITIONER

## 2025-02-24 PROCEDURE — 83690 ASSAY OF LIPASE: CPT | Mod: ZL | Performed by: NURSE PRACTITIONER

## 2025-02-24 PROCEDURE — 86140 C-REACTIVE PROTEIN: CPT | Mod: ZL | Performed by: NURSE PRACTITIONER

## 2025-02-24 PROCEDURE — 84155 ASSAY OF PROTEIN SERUM: CPT | Mod: ZL | Performed by: NURSE PRACTITIONER

## 2025-02-24 PROCEDURE — 82150 ASSAY OF AMYLASE: CPT | Mod: ZL | Performed by: NURSE PRACTITIONER

## 2025-02-24 PROCEDURE — 85025 COMPLETE CBC W/AUTO DIFF WBC: CPT | Mod: ZL | Performed by: NURSE PRACTITIONER

## 2025-02-24 PROCEDURE — 71046 X-RAY EXAM CHEST 2 VIEWS: CPT

## 2025-02-24 PROCEDURE — 99203 OFFICE O/P NEW LOW 30 MIN: CPT | Performed by: NURSE PRACTITIONER

## 2025-02-24 ASSESSMENT — ENCOUNTER SYMPTOMS
PSYCHIATRIC NEGATIVE: 1
ALLERGIC/IMMUNOLOGIC NEGATIVE: 1
HEMATOLOGIC/LYMPHATIC NEGATIVE: 1
ABDOMINAL PAIN: 1
EYES NEGATIVE: 1
CARDIOVASCULAR NEGATIVE: 1
RESPIRATORY NEGATIVE: 1
MUSCULOSKELETAL NEGATIVE: 1
CONSTITUTIONAL NEGATIVE: 1
ENDOCRINE NEGATIVE: 1
NEUROLOGICAL NEGATIVE: 1

## 2025-02-24 ASSESSMENT — PAIN SCALES - GENERAL: PAINLEVEL_OUTOF10: MILD PAIN (3)

## 2025-02-24 NOTE — PROGRESS NOTES
Denilson Trammell  1980    ASSESSMENT/PLAN    Presents to [facility] with left-sided abdominal pain overall stable since this past weekend.  Patient had similar pain a few months ago after drinking alcohol.  Patient states he did drink alcohol this past weekend.  Patient has no history of alcohol induced pancreatitis or liver disease.  No nausea, vomiting, fever, chills.  Patient states his last bowel movement was yesterday, normal.  No urination changes.  No history of kidney stones.  No history of gallbladder issues.  Patient is overall eating and drinking well.   Labs were obtained in [facility] and reassuring.  CMP, CBC and CRP within normal limits.  Lipase and amylase within normal limits.  Analysis obtained and no abnormalities, no blood.  Given normal lab results chest x-ray and abdominal x-ray were obtained with no significant abnormalities.  Noted stool burden, left side greater than right.  Discussed with patient that I would recommend treatment of constipation and reevaluation if symptoms or not improving.  Patient's vitals are stable and he appears nontoxic.        1. Left upper quadrant pain (Primary)  2. Constipation    - CBC and Differential  - CRP inflammation  - Comprehensive Metabolic Panel  - Lipase  - Amylase  - UA with Microscopic reflex to Culture   - XR Chest 2 Views  - XR Abdomen 2 Views  - Symptomatic treatment - Encouraged fluids, salt water gargles, honey, humidifier, saline nasal spray, lozenges, tea, soup, smoothies, popsicles, topical vapor rub, rest, etc   - May use over-the-counter Tylenol or ibuprofen PRN  - Follow up as needed for new or worsening symptoms          *A shared decision making model was used.   *Patient and/or associated parties understood and were agreeable to treatment plan.   *Plan and all results were discussed.   *Explanation of diagnosis, treatment options and risk and benefits of medications reviewed with patient.    *Time was taken to answer all  questions.   *Red flags symptoms were discussed and patient was advised when they should return for reevaluation or for prompt emergency evaluation.   *Patient was given verbal and written instructions on plan of care. Instructions were printed or are available on [employer] on electronic AVS.   *We discussed potential side effects of any prescribed or recommended therapies, as well as expectations for response to treatments.  *Patient discharged in stable condition    Janice Ledesma CNP  [facility]    SUBJECTIVE  CHIEF COMPLAINT/ REASON FOR VISIT  Patient presents with:  Abdominal Pain     HISTORY OF PRESENT ILLNESS  Denilson Trammell is a pleasant 44 year old male [facility] with left-sided abdominal pain overall stable since this past weekend.  Patient had similar pain a few months ago after drinking alcohol.  Patient states he did drink alcohol this past weekend.  Patient has no history of alcohol induced pancreatitis or liver disease.  No nausea, vomiting, fever, chills.  Patient states his last bowel movement was yesterday, normal.  No urination changes.  No history of kidney stones.  No history of gallbladder issues.  Patient is overall eating and drinking well.        I have reviewed the nursing notes.  I have reviewed allergies, medication list, problem list, and past medical history.    REVIEW OF SYSTEMS  Review of Systems   Constitutional: Negative.    HENT: Negative.     Eyes: Negative.    Respiratory: Negative.     Cardiovascular: Negative.    Gastrointestinal:  Positive for abdominal pain.   Endocrine: Negative.    Genitourinary: Negative.    Musculoskeletal: Negative.    Skin: Negative.    Allergic/Immunologic: Negative.    Neurological: Negative.    Hematological: Negative.    Psychiatric/Behavioral: Negative.     All other systems reviewed and are negative.       VITAL SIGNS  Vitals:    02/24/25 1225   BP: 128/72   Pulse: 80   Resp: 18   Temp: 97.3  F (36.3  C)  "  TempSrc: Tympanic   SpO2: 97%   Weight: 104.3 kg (230 lb)   Height: 1.778 m (5' 10\")      Body mass index is 33 kg/m .      OBJECTIVE  PHYSICAL EXAM  Physical Exam  Vitals and nursing note reviewed.   Constitutional:       Appearance: Normal appearance.   HENT:      Head: Normocephalic.      Right Ear: Tympanic membrane normal.      Left Ear: Tympanic membrane normal.      Nose: Nose normal.      Mouth/Throat:      Mouth: Mucous membranes are moist.   Eyes:      Pupils: Pupils are equal, round, and reactive to light.   Cardiovascular:      Rate and Rhythm: Normal rate and regular rhythm.      Pulses: Normal pulses.      Heart sounds: Normal heart sounds.   Pulmonary:      Effort: Pulmonary effort is normal.      Breath sounds: Normal breath sounds.   Abdominal:      General: Bowel sounds are normal.      Palpations: Abdomen is soft.   Musculoskeletal:         General: Normal range of motion.      Cervical back: Normal range of motion.   Skin:     General: Skin is warm and dry.      Capillary Refill: Capillary refill takes less than 2 seconds.   Neurological:      General: No focal deficit present.      Mental Status: He is alert.            DIAGNOSTICS  Results for orders placed or performed in visit on 02/24/25   XR Chest 2 Views     Status: None    Narrative    PROCEDURE:  XR CHEST 2 VIEWS    HISTORY: Left upper quadrant pain    COMPARISON: No relevant priors available for comparison    FINDINGS: PA and lateral chest radiographs    Cardiomediastinal silhouette is within normal limits.  No focal consolidation, effusion or pneumothorax.  Streaky left  midlung atelectasis versus scarring.  No suspicious osseous lesion or subdiaphragmatic free air.      Impression    IMPRESSION:      No acute findings.    MOY CABALLERO MD         SYSTEM ID:  [id number]   XR Abdomen 2 Views     Status: None    Narrative    PROCEDURE:  XR ABDOMEN 2 VIEWS    HISTORY: Left upper quadrant pain    COMPARISON: No relevant priors available for " comparison    TECHNIQUE:  AP upright and supine radiographs of the abdomen.    FINDINGS:    The lung bases are clear. No subdiaphragmatic air is seen.     No dilated loops of small bowel or air-fluid levels are seen. Minimal  stool burden.    No suspicious osseous lesions.      Impression    IMPRESSION:    No obstruction or free air.    MOY CABALLERO MD         SYSTEM ID:  [id number]   CRP inflammation     Status: Normal   Result Value Ref Range    CRP Inflammation <3.00 <5.00 mg/L   Comprehensive Metabolic Panel     Status: Normal   Result Value Ref Range    Sodium 140 135 - 145 mmol/L    Potassium 4.2 3.4 - 5.3 mmol/L    Carbon Dioxide (CO2) 27 22 - 29 mmol/L    Anion Gap 11 7 - 15 mmol/L    Urea Nitrogen 14.1 6.0 - 20.0 mg/dL    Creatinine 0.99 0.67 - 1.17 mg/dL    GFR Estimate >90 >60 mL/min/1.73m2    Calcium 9.5 8.8 - 10.4 mg/dL    Chloride 102 98 - 107 mmol/L    Glucose 93 70 - 99 mg/dL    Alkaline Phosphatase 67 40 - 150 U/L    AST 17 0 - 45 U/L    ALT 18 0 - 70 U/L    Protein Total 7.4 6.4 - 8.3 g/dL    Albumin 4.7 3.5 - 5.2 g/dL    Bilirubin Total 0.4 <=1.2 mg/dL   Lipase     Status: Normal   Result Value Ref Range    Lipase 32 13 - 60 U/L   Amylase     Status: Normal   Result Value Ref Range    Amylase 39 28 - 100 U/L   UA with Microscopic reflex to Culture     Status: Normal    Specimen: Urine, Clean Catch   Result Value Ref Range    Color Urine Light Yellow Colorless, Straw, Light Yellow, Yellow    Appearance Urine Clear Clear    Glucose Urine Negative Negative mg/dL    Bilirubin Urine Negative Negative    Ketones Urine Negative Negative mg/dL    Specific Gravity Urine 1.021 1.000 - 1.030    Blood Urine Negative Negative    pH Urine 7.5 5.0 - 9.0    Protein Albumin Urine Negative Negative mg/dL    Urobilinogen Urine Normal Normal, 2.0 mg/dL    Nitrite Urine Negative Negative    Leukocyte Esterase Urine Negative Negative    RBC Urine 0 <=2 /HPF    WBC Urine <1 <=5 /HPF    Narrative    Urine Culture not  indicated   CBC with platelets and differential     Status: None   Result Value Ref Range    WBC Count 6.0 4.0 - 11.0 10e3/uL    RBC Count 4.52 4.40 - 5.90 10e6/uL    Hemoglobin 13.8 13.3 - 17.7 g/dL    Hematocrit 40.4 40.0 - 53.0 %    MCV 89 78 - 100 fL    MCH 30.5 26.5 - 33.0 pg    MCHC 34.2 31.5 - 36.5 g/dL    RDW 12.5 10.0 - 15.0 %    Platelet Count 253 150 - 450 10e3/uL    % Neutrophils 60 %    % Lymphocytes 32 %    % Monocytes 6 %    % Eosinophils 1 %    % Basophils 1 %    % Immature Granulocytes 0 %    NRBCs per 100 WBC 0 <1 /100    Absolute Neutrophils 3.6 1.6 - 8.3 10e3/uL    Absolute Lymphocytes 1.9 0.8 - 5.3 10e3/uL    Absolute Monocytes 0.4 0.0 - 1.3 10e3/uL    Absolute Eosinophils 0.1 0.0 - 0.7 10e3/uL    Absolute Basophils 0.1 0.0 - 0.2 10e3/uL    Absolute Immature Granulocytes 0.0 <=0.4 10e3/uL    Absolute NRBCs 0.0 10e3/uL   CBC and Differential     Status: None    Narrative    The following orders were created for panel order CBC and Differential.  Procedure                               Abnormality         Status                     ---------                               -----------         ------                     CBC with platelets and d...[[id number]]                      Final result                 Please view results for these tests on the individual orders.

## 2025-02-24 NOTE — NURSING NOTE
"Chief Complaint   Patient presents with    Abdominal Pain     Patient here for dull ache in lower left abdomin x2 days.     Initial /72   Pulse 80   Temp 97.3  F (36.3  C) (Tympanic)   Resp 18   Ht 1.778 m (5' 10\")   Wt 104.3 kg (230 lb)   SpO2 97%   BMI 33.00 kg/m   Estimated body mass index is 33 kg/m  as calculated from the following:    Height as of this encounter: 1.778 m (5' 10\").    Weight as of this encounter: 104.3 kg (230 lb).  Medication Review: complete    The next two questions are to help us understand your food security.  If you are feeling you need any assistance in this area, we have resources available to support you today.          2/24/2025   SDOH- Food Insecurity   Within the past 12 months, did you worry that your food would run out before you got money to buy more? N   Within the past 12 months, did the food you bought just not last and you didn t have money to get more? N         Health Care Directive:  Patient does not have a Health Care Directive: Discussed advance care planning with patient; however, patient declined at this time.    Rossi Gipson LPN      "

## (undated) DEVICE — SOL WATER 1500ML

## (undated) DEVICE — SUCTION MANIFOLD NEPTUNE 2 SYS 4 PORT 0702-020-000

## (undated) DEVICE — TUBING SUCTION 10'X3/16" N510

## (undated) DEVICE — ENDO KIT COMPLIANCE DYKENDOCMPLY

## (undated) DEVICE — ENDO BRUSH CHANNEL MASTER CLEANING 2-4.2MM BW-412T

## (undated) RX ORDER — PROPOFOL 10 MG/ML
INJECTION, EMULSION INTRAVENOUS
Status: DISPENSED
Start: 2023-01-25